# Patient Record
Sex: MALE | Race: WHITE | Employment: FULL TIME | URBAN - METROPOLITAN AREA
[De-identification: names, ages, dates, MRNs, and addresses within clinical notes are randomized per-mention and may not be internally consistent; named-entity substitution may affect disease eponyms.]

---

## 2017-06-09 ENCOUNTER — HOSPITAL ENCOUNTER (OUTPATIENT)
Dept: PHYSICAL THERAPY | Age: 62
Discharge: HOME | End: 2017-06-09
Payer: COMMERCIAL

## 2017-06-09 DIAGNOSIS — M25.512 CHRONIC LEFT SHOULDER PAIN: ICD-10-CM

## 2017-06-09 DIAGNOSIS — G89.29 CHRONIC LEFT SHOULDER PAIN: ICD-10-CM

## 2017-06-09 PROCEDURE — 97035 APP MDLTY 1+ULTRASOUND EA 15: CPT

## 2017-06-09 PROCEDURE — 97162 PT EVAL MOD COMPLEX 30 MIN: CPT

## 2017-06-09 NOTE — PROGRESS NOTES
Korin Yousif  : 1955 Therapy Center at [facility] Therapy  [address], [facility], [address]  [phone]   [phone]         OUTPATIENT PHYSICAL THERAPY:Initial Assessment 2017    ICD-10: Treatment Diagnosis: Impingement syndrome of left shoulder (M75.42)  Pain in left shoulder (M25.512)  Precautions/Allergies:   Codeine   Fall Risk Score: 7 (? 5 = High Risk)  MD Orders: Evaluate and treat,  MEDICAL/REFERRING DIAGNOSIS:  Chronic left shoulder pain [M25.512, G89.29]   DATE OF ONSET: gradual  REFERRING PHYSICIAN: Sehy Collado*  RETURN PHYSICIAN APPOINTMENT: as needed     INITIAL ASSESSMENT:  Mr. Jessy Ken presents with gradual onset of L shoulder pain over past several years with recent worsening of symptoms that impact L UE ADL's especially overhead activities and reaching behind back. Objective findings are indicative of impingement syndrome/rotator cuff dysfunction. He has mildly reduced ROM, decreased rotator cuff/scapular muscle strength and can benefit from skilled therapy to address impairments for decreased shoulder pain and improved function. PROBLEM LIST (Impacting functional limitations):  1. Decreased Strength  2. Decreased ADL/Functional Activities  3. Increased Pain  4. Decreased Activity Tolerance  5. Decreased Flexibility/Joint Mobility  6. Decreased Plato with Home Exercise Program INTERVENTIONS PLANNED  1. Home Exercise Program (HEP)  2. Manual Therapy  3. Range of Motion (ROM)  4. Therapeutic Exercise/Strengthening  5. Modalities/taping as indicated   TREATMENT PLAN:  Effective Dates: 17 TO 17. Frequency/Duration: 2 times a week for 12 weeks  GOALS: (Goals have been discussed and agreed upon with patient.)  SHORT-TERM FUNCTIONAL GOALS: Time Frame: 4 weeks  1. Decrease pain by 2/10 to allow greater ease with reaching behind back and overhead ADL's  2.  Increase strength shoulder ER and flex by 1/2 grade for greater ease with lifting and reaching ADL's  3. Greene in HEP with minimal cueing. DISCHARGE GOALS: Time Frame: 12 weeks  1. Decrease DASH 9 points for greater ease with all UE ADL's and recreational activities, including golf and total gym workout. 2. Increase strength of rotator cuff and scapula musculature to WDL for greater ease/minimal pain with overhead and reaching UE ADL's  3. Demonstrate independence in advanced home exercise program to allow DC from physical therapy. Rehabilitation Potential For Stated Goals: Good  Regarding Mario Howard's therapy, I certify that the treatment plan above will be carried out by a therapist or under their direction. Thank you for this referral,  Bertin Barahona PT     Referring Physician Signature: Angela Castellanos*              Date                    The information in this section was collected on 6/9/17  (except where otherwise noted). HISTORY:   History of Present Injury/Illness (Reason for Referral):  Patient states that he has had vague pain in both shoulders for past couple of years. Reports recent worsening of L shoulder pain to the point that he gets pretty significant sharp pain with certain movts such as reaching overhead, out to side, and particularly when reaching behind back. Pain lasts for a couple of minutes afterwards. Some aching but seems to be better since starting taking aleve. Plays golf and doesn't notice it too much with that except when reaching across body. Uses total gym at home and has had to modify some of his exercise due to increased pain - can't do fly's now for example. Hasn't been able to throw a ball for some time even with R arm. Occasional pain at night. Pain mostly in lateral shoulder, upper arm area  Past Medical History/Comorbidities:   Mr. Quintin Cleveland  has a past medical history of Agatston CAC score 100-199 (12/10/2012); Allergic rhinitis; AR (allergic rhinitis);  Chickenpox; H/O colonoscopy (1/2010 and 2/2013); Hyperlipidemia; and Peripheral neuropathy ([facility] [address] Mr. Sydney Sexton  has a past surgical history that includes orthopaedic and colonoscopy. Social History/Living Environment:     non-contributary  Prior Level of Function/Work/Activity:  Unrestricted. Works at desk most of the day  Dominant Side:         RIGHT  Current Medications:       Current Outpatient Prescriptions:     atorvastatin (LIPITOR) 20 mg tablet, Take 1 Tab by mouth daily. , Disp: 90 Tab, Rfl: 4    naproxen (NAPROSYN) 500 mg tablet, Take 1 Tab by mouth two (2) times daily (with meals). , Disp: 180 Tab, Rfl: 3    aspirin delayed-release 81 mg tablet, Take  by mouth daily. , Disp: , Rfl:     loratadine (CLARITIN) 10 mg tablet, Take 10 mg by mouth daily.   , Disp: , Rfl:    Date Last Reviewed:  6/9/2017   Number of Personal Factors/Comorbidities that affect the Plan of Care: 0: LOW COMPLEXITY   EXAMINATION:   Observation/Orthostatic Postural Assessment:          Patient with mildly protracted shoulders bilat, wide scap-spine distance  Palpation:          Mild to mod tenderness over rotator cuff insertion  ROM:       LUE Assessment  LUE Assessment (WDL): Exceptions to WDL  LUE AROM  L Shoulder Flexion: 145 (pain @ 120)  L Shoulder Extension: 45 (pain end rg)  LUE PROM  L Shoulder Internal Rotation: 45 (increased pain)  L Shoulder External Rotation: 65 (pain end rg)   IR and ER at 45 deg WDL, ltd as noted above at 90 deg abd     IR behind back to L-S area with difficulty due to pain/tightness        RUE Assessment  RUE Assessment (WDL): Within defined limits      ER at 90 deg - 105  IR at 90 deg - 55    IR behind back to upper lumbar/lower thoracic area      Strength:    LUE Strength, Tone, Sensation  LUE Strength, Tone, Sensation (WDL): Exception to WDL  LUE Strength  L Shoulder Flexion: 4  L Shoulder ABduction: 4-  L Shoulder External Rotation: 4-   Pain mainly with resisted abd     RUE Strength, Tone, Sensation  RUE Strength, Tone, Sensation (WDL): Within defined limits            Special Tests:       +ve impingement signs - Melissa Argueta. C/S WDL  Neurological Screen:       WDL  Functional Mobility:       Limited with UE ADL's due to pain  Balance:        WDL   Body Structures Involved:  1. Bones  2. Joints  3. Muscles  4. Ligaments Body Functions Affected:  1. Sensory/Pain  2. Neuromusculoskeletal  3. Movement Related Activities and Participation Affected:  1. General Tasks and Demands  2. Mobility  3. Self Care  4. Recreational acitivites   Number of elements (examined above) that affect the Plan of Care: 3: MODERATE COMPLEXITY   CLINICAL PRESENTATION:   Presentation: Evolving clinical presentation with changing clinical characteristics: MODERATE COMPLEXITY   CLINICAL DECISION MAKING:   Outcome Measure: Tool Used: Disabilities of the Arm, Shoulder and Hand (DASH) Questionnaire - Quick Version  Score:  Initial: 20/55  Most Recent: X/55 (Date: -- )   Interpretation of Score: The DASH is designed to measure the activities of daily living in person's with upper extremity dysfunction or pain. Each section is scored on a 1-5 scale, 5 representing the greatest disability. The scores of each section are added together for a total score of 55. Score 11 12-19 20-28 29-37 38-45 46-54 55   Modifier CH CI CJ CK CL CM CN     Medical Necessity:   · Patient is expected to demonstrate progress in strength, range of motion and functional technique to increase independence with all UE ADL's. · Skilled intervention continues to be required due to pain, decreased ROM & strength limiting UE ADL's. Reason for Services/Other Comments:  · Patient continues to require present interventions due to patient's inability to perform UE ADL's without pain  · Patient continues to require modification of therapeutic interventions to increase complexity of exercises.    Use of outcome tool(s) and clinical judgement create a POC that gives a: Questionable prediction of patient's progress: MODERATE COMPLEXITY            TREATMENT:   (In addition to Assessment/Re-Assessment sessions the following treatments were rendered)  Pre-treatment Symptoms/Complaints:  Pain in L shoulder/uppper arm with decreased ROM/strength  Pain: Initial:   Pain Intensity 1: 7  Post Session:  6     Therapeutic Exercise: ( ):  Exercises as outlined below to improve mobility and strength. Required moderate verbal and tactile cues to promote proper body alignment and promote proper body mechanics. Progressed resistance, range and repetitions as indicated. The patient received exercise instruction followed by patient demonstration of the exercises to include:        jm scapula retraction/depression      Side ER, 1# - 3 sets/10 - to gradually increase to 3 sets/15-20 as enzo      Jm sh ext/scap retraction, green Tband, 2-3 sets/10      Extension stretching with cane behind back - 10 reps, 4 x/day      Instructed in postural correction in sitting and standing and its contribution in facilitating normal shoulder mechanics. Manual Therapy (      ): extension stretches  Evaluation (X  ):  Therapeutic Modalities: US x 8 mins @ 1.5 w/cm L shoulder to increase circulation/promote healing. Advised to use ice at home if painful    Treatment/Session Assessment:    · Response to Treatment: :  Patient tolerated treatment today well with no complications. The patient demonstrated independence with the initial home exercises and is to perform the exercises in conjunction with continued physical therapy. Reported decreased pain at end of visit with minimal pain reaching behind back. · Compliance with Program/Exercises: Will assess as treatment progresses. · Recommendations/Intent for next treatment session: \"Next visit will focus on advancements to more challenging activities and manual therapy to increae ROM, graduated strengthening of rotator cuff/scap musc, US. \".   Total Treatment Duration:  PT Patient Time In/Time Out  Time In: 0900  Time Out: 1000  Visits: [id number], PT

## 2017-06-09 NOTE — PROGRESS NOTES
Ambulatory/Rehab Services H2 Model Falls Risk Assessment    Risk Factor Pts. ·   Confusion/Disorientation/Impulsivity  []    4 ·   Symptomatic Depression  []   2 ·   Altered Elimination  []   1 ·   Dizziness/Vertigo  []   1 ·   Gender (Male)  [x]   1 ·   Any administered antiepileptics (anticonvulsants):  []   2 ·   Any administered benzodiazepines:  []   1 ·   Visual Impairment (specify):  []   1 ·   Portable Oxygen Use  []   1 ·   Orthostatic ? BP  []   1 ·   History of Recent Falls (within 3 mos.)  []   5     Ability to Rise from Chair (choose one) Pts. ·   Ability to rise in a single movement  [x]   0 ·   Pushes up, successful in one attempt  []   1 ·   Multiple attempts, but successful  []   3 ·   Unable to rise without assistance  []   4   Total: (5 or greater = High Risk) 1     Falls Prevention Plan:   []                Physical Limitations to Exercise (specify):   []                Mobility Assistance Device (type):   []                Exercise/Equipment Adaptation (specify):    ©2010 [facility] [address] Patent #[id number].  Federal Law prohibits the replication, distribution or use without written permission from [facility] [employer]

## 2017-06-16 ENCOUNTER — HOSPITAL ENCOUNTER (OUTPATIENT)
Dept: PHYSICAL THERAPY | Age: 62
Discharge: HOME | End: 2017-06-16
Payer: COMMERCIAL

## 2017-06-16 PROCEDURE — 97035 APP MDLTY 1+ULTRASOUND EA 15: CPT

## 2017-06-16 PROCEDURE — 97110 THERAPEUTIC EXERCISES: CPT

## 2017-06-16 NOTE — PROGRESS NOTES
Korin Yousif  : 1955 Therapy Center at [facility] Therapy  [address], [address]  [phone]   [phone]         OUTPATIENT PHYSICAL THERAPY:Daily Note 2017    ICD-10: Treatment Diagnosis: Impingement syndrome of left shoulder (M75.42)  Pain in left shoulder (M25.512)  Precautions/Allergies:   Codeine   Fall Risk Score: 7 (? 5 = High Risk)  MD Orders: Evaluate and treat,  MEDICAL/REFERRING DIAGNOSIS:  Pain in left shoulder [M25.512]  Other chronic pain [G89.29]   DATE OF ONSET: gradual  REFERRING PHYSICIAN: Oswaldo Fisher. [facility].: as needed     INITIAL ASSESSMENT:  Mr. Jessy Ken presents with gradual onset of L shoulder pain over past several years with recent worsening of symptoms that impact L UE ADL's especially overhead activities and reaching behind back. Objective findings are indicative of impingement syndrome/rotator cuff dysfunction. He has mildly reduced ROM, decreased rotator cuff/scapular muscle strength and can benefit from skilled therapy to address impairments for decreased shoulder pain and improved function. PROBLEM LIST (Impacting functional limitations):  1. Decreased Strength  2. Decreased ADL/Functional Activities  3. Increased Pain  4. Decreased Activity Tolerance  5. Decreased Flexibility/Joint Mobility  6. Decreased M[place] with Home Exercise Program INTERVENTIONS PLANNED  1. Home Exercise Program (HEP)  2. Manual Therapy  3. Range of Motion (ROM)  4. Therapeutic Exercise/Strengthening  5. Modalities/taping as indicated   TREATMENT PLAN:  Effective Dates: 17 TO 17. Frequency/Duration: 2 times a week for 12 weeks  GOALS: (Goals have been discussed and agreed upon with patient.)  SHORT-TERM FUNCTIONAL GOALS: Time Frame: 4 weeks  1. Decrease pain by 2/10 to allow greater ease with reaching behind back and overhead ADL's  2.  Increase strength shoulder ER and flex by 1/2 grade for greater ease with lifting and reaching ADL's  3. Albert in HEP with minimal cueing. DISCHARGE GOALS: Time Frame: 12 weeks  1. Decrease DASH 9 points for greater ease with all UE ADL's and recreational activities, including golf and total gym workout. 2. Increase strength of rotator cuff and scapula musculature to WDL for greater ease/minimal pain with overhead and reaching UE ADL's  3. Demonstrate independence in advanced home exercise program to allow DC from physical therapy. Rehabilitation Potential For Stated Goals: Good  Regarding Pauline Howard's therapy, I certify that the treatment plan above will be carried out by a therapist or under their direction. Thank you for this referral,  Keyanna Mills, PT     Referring Physician Signature: Jaxon Silva*              Date                    The information in this section was collected on 6/9/17  (except where otherwise noted). HISTORY:   History of Present Injury/Illness (Reason for Referral):  Patient states that he has had vague pain in both shoulders for past couple of years. Reports recent worsening of L shoulder pain to the point that he gets pretty significant sharp pain with certain movts such as reaching overhead, out to side, and particularly when reaching behind back. Pain lasts for a couple of minutes afterwards. Some aching but seems to be better since starting taking aleve. Plays golf and doesn't notice it too much with that except when reaching across body. Uses total gym at home and has had to modify some of his exercise due to increased pain - can't do fly's now for example. Hasn't been able to throw a ball for some time even with R arm. Occasional pain at night. Pain mostly in lateral shoulder, upper arm area  Past Medical History/Comorbidities:   Mr. Gloria Dalal  has a past medical history of Agatston CAC score 100-199 (12/10/2012); Allergic rhinitis; AR (allergic rhinitis);  Chickenpox; H/O colonoscopy (1/2010 and 2/2013); Hyperlipidemia; and Peripheral neuropathy ([facility] [address] Mr. Yasir Kay  has a past surgical history that includes orthopaedic and colonoscopy. Social History/Living Environment:     non-contributary  Prior Level of Function/Work/Activity:  Unrestricted. Works at desk most of the day  Dominant Side:         RIGHT  Current Medications:       Current Outpatient Prescriptions:     atorvastatin (LIPITOR) 20 mg tablet, Take 1 Tab by mouth daily. , Disp: 90 Tab, Rfl: 4    naproxen (NAPROSYN) 500 mg tablet, Take 1 Tab by mouth two (2) times daily (with meals). , Disp: 180 Tab, Rfl: 3    aspirin delayed-release 81 mg tablet, Take  by mouth daily. , Disp: , Rfl:     loratadine (CLARITIN) 10 mg tablet, Take 10 mg by mouth daily. , Disp: , Rfl:    Date Last Reviewed:  6/16/2017   Number of Personal Factors/Comorbidities that affect the Plan of Care: 0: LOW COMPLEXITY   EXAMINATION:   Observation/Orthostatic Postural Assessment:          Patient with mildly protracted shoulders bilat, wide scap-spine distance  Palpation:          Mild to mod tenderness over rotator cuff insertion  ROM:           IR and ER at 45 deg WDL, ltd as noted above at 90 deg abd     IR behind back to L-S area with difficulty due to pain/tightness               ER at 90 deg - 105  IR at 90 deg - 55    IR behind back to upper lumbar/lower thoracic area      Strength:        Pain mainly with resisted abd                  Special Tests:       +ve impingement signs - Melissa Argueta. C/S WDL  Neurological Screen:       WDL  Functional Mobility:       Limited with UE ADL's due to pain  Balance:        WDL   Body Structures Involved:  1. Bones  2. Joints  3. Muscles  4. Ligaments Body Functions Affected:  1. Sensory/Pain  2. Neuromusculoskeletal  3. Movement Related Activities and Participation Affected:  1. General Tasks and Demands  2. Mobility  3. Self Care  4.  Recreational acitivites   Number of elements (examined above) that affect the Plan of Care: 3: MODERATE COMPLEXITY   CLINICAL PRESENTATION:   Presentation: Evolving clinical presentation with changing clinical characteristics: MODERATE COMPLEXITY   CLINICAL DECISION MAKING:   Outcome Measure: Tool Used: Disabilities of the Arm, Shoulder and Hand (DASH) Questionnaire - Quick Version  Score:  Initial: 20/55  Most Recent: X/55 (Date: -- )   Interpretation of Score: The DASH is designed to measure the activities of daily living in person's with upper extremity dysfunction or pain. Each section is scored on a 1-5 scale, 5 representing the greatest disability. The scores of each section are added together for a total score of 55. Score 11 12-19 20-28 29-37 38-45 46-54 55   Modifier CH CI CJ CK CL CM CN     Medical Necessity:   · Patient is expected to demonstrate progress in strength, range of motion and functional technique to increase independence with all UE ADL's. · Skilled intervention continues to be required due to pain, decreased ROM & strength limiting UE ADL's. Reason for Services/Other Comments:  · Patient continues to require present interventions due to patient's inability to perform UE ADL's without pain  · Patient continues to require modification of therapeutic interventions to increase complexity of exercises. Use of outcome tool(s) and clinical judgement create a POC that gives a: Questionable prediction of patient's progress: MODERATE COMPLEXITY            TREATMENT:   (In addition to Assessment/Re-Assessment sessions the following treatments were rendered)  Pre-treatment Symptoms/Complaints:  Patient reports he is generally sore as has been working on building deck but has not had sharp pain since initial visit.  Has been doing ex's as instructed though has only managed ext stretching ~ 2x/day  Pain in L shoulder/uppper arm with decreased ROM/strength  Pain: Initial:   Pain Intensity 1: 6  Post Session:  6     Therapeutic Exercise: (30 ):  Exercises as outlined below to improve mobility and strength. Required moderate verbal and tactile cues to promote proper body alignment and promote proper body mechanics. Progressed resistance, range and repetitions as indicated. lang scapula retraction/depression - reviewed      Side ER, 1# - 3 sets/10 - to gradually increase to 3 sets/15-20 as enzo      Lang sh ext/scap retraction, green Tband, 2 sets/10      Supine sh prot - 2 x 10, 5#      Prone rows, 5#, 2 x 10      Prone ext, 3#, 2 x 10      IR, red Tband, 2 x 10      ER, yellow Tband, 2 x 10      Extension stretching behind back - 10 reps, 3X      Postural correction in sitting and standing and its contribution in facilitating normal shoulder mechanics - reviewed  Manual Therapy (7):  jt mobs - inf glides, P-A's, long distraction  Therapeutic Modalities: US x 8 mins @ 1.5 w/cm L shoulder to increase circulation/promote healing. Advised to use ice at home if painful  HEP: As directed, to Anisha R/ER Tband, supine prot  Treatment/Session Assessment:    · Response to Treatment: :  Patient tolerated treatment today well with no complications. Tolerated ex's well - no significant pain. Good response to therapy so far. Will gradually progress ex as enzo with continued emphasis on correct scapulothoracic posn. · Compliance with Program/Exercises: Compliant with HEP. · Recommendations/Intent for next treatment session: \"Next visit will focus on advancements to more challenging activities and manual therapy to increae ROM, graduated strengthening of rotator cuff/scap musc, US. \".   Total Treatment Duration:  PT Patient Time In/Time Out  Time In: 0900  Time Out: 0945  Visits: Manuel Rodriguez PT

## 2017-06-23 ENCOUNTER — HOSPITAL ENCOUNTER (OUTPATIENT)
Dept: PHYSICAL THERAPY | Age: 62
Discharge: HOME | End: 2017-06-23
Payer: COMMERCIAL

## 2017-06-23 PROCEDURE — 97110 THERAPEUTIC EXERCISES: CPT

## 2017-06-23 PROCEDURE — 97140 MANUAL THERAPY 1/> REGIONS: CPT

## 2017-06-23 NOTE — PROGRESS NOTES
Yola Tate  : 1955 [facility] at [facility]  [address], [facility], [address]  [phone]   [phone]         OUTPATIENT PHYSICAL THERAPY:Daily Note 2017    ICD-10: Treatment Diagnosis: Impingement syndrome of left shoulder (M75.42)  Pain in left shoulder (M25.512)  Precautions/Allergies:   Codeine   Fall Risk Score: 7 (? 5 = High Risk)  MD Orders: Evaluate and treat,  MEDICAL/REFERRING DIAGNOSIS:  Pain in left shoulder [M25.512]  Other chronic pain [G89.29]   DATE OF ONSET: gradual  REFERRING PHYSICIAN: Oswaldo Fisher. [facility].: as needed     INITIAL ASSESSMENT:  Mr. ANDRE Baltazar presents with gradual onset of L shoulder pain over past several years with recent worsening of symptoms that impact L UE ADL's especially overhead activities and reaching behind back. Objective findings are indicative of impingement syndrome/rotator cuff dysfunction. He has mildly reduced ROM, decreased rotator cuff/scapular muscle strength and can benefit from skilled therapy to address impairments for decreased shoulder pain and improved function. PROBLEM LIST (Impacting functional limitations):  1. Decreased Strength  2. Decreased ADL/Functional Activities  3. Increased Pain  4. Decreased Activity Tolerance  5. Decreased Flexibility/Joint Mobility  6. Decreased [place] with Home Exercise Program INTERVENTIONS PLANNED  1. Home Exercise Program (HEP)  2. Manual Therapy  3. Range of Motion (ROM)  4. Therapeutic Exercise/Strengthening  5. Modalities/taping as indicated   TREATMENT PLAN:  Effective Dates: 17 TO 17. Frequency/Duration: 2 times a week for 12 weeks  GOALS: (Goals have been discussed and agreed upon with patient.)  SHORT-TERM FUNCTIONAL GOALS: Time Frame: 4 weeks  1. Decrease pain by 2/10 to allow greater ease with reaching behind back and overhead ADL's  2.  Increase strength shoulder ER and flex by 1/2 grade for greater ease with lifting and reaching ADL's  3. [place] in HEP with minimal cueing. DISCHARGE GOALS: Time Frame: 12 weeks  1. Decrease DASH 9 points for greater ease with all UE ADL's and recreational activities, including golf and total gym workout. 2. Increase strength of rotator cuff and scapula musculature to WDL for greater ease/minimal pain with overhead and reaching UE ADL's  3. Demonstrate independence in advanced home exercise program to allow DC from physical therapy. Rehabilitation Potential For Stated Goals: Good  Regarding Lashell Howard's therapy, I certify that the treatment plan above will be carried out by a therapist or under their direction. Thank you for this referral,  Awilda Beckman, PT     Referring Physician Signature: Jonny Stringer*              Date                    The information in this section was collected on 6/9/17  (except where otherwise noted). HISTORY:   History of Present Injury/Illness (Reason for Referral):  Patient states that he has had vague pain in both shoulders for past couple of years. Reports recent worsening of L shoulder pain to the point that he gets pretty significant sharp pain with certain movts such as reaching overhead, out to side, and particularly when reaching behind back. Pain lasts for a couple of minutes afterwards. Some aching but seems to be better since starting taking aleve. Plays golf and doesn't notice it too much with that except when reaching across body. Uses total gym at home and has had to modify some of his exercise due to increased pain - can't do fly's now for example. Hasn't been able to throw a ball for some time even with R arm. Occasional pain at night. Pain mostly in lateral shoulder, upper arm area  Past Medical History/Comorbidities:   Mr. Desirae Espinosa  has a past medical history of Agatston CAC score 100-199 (12/10/2012); Allergic rhinitis; AR (allergic rhinitis);  Chickenpox; H/O colonoscopy (1/2010 and 2/2013); Hyperlipidemia; and Peripheral neuropathy ([facility] [address] Mr. Romel Kong  has a past surgical history that includes orthopaedic and colonoscopy. Social History/Living Environment:     non-contributary  Prior Level of Function/Work/Activity:  Unrestricted. Works at desk most of the day  Dominant Side:         RIGHT  Current Medications:       Current Outpatient Prescriptions:     atorvastatin (LIPITOR) 20 mg tablet, Take 1 Tab by mouth daily. , Disp: 90 Tab, Rfl: 4    naproxen (NAPROSYN) 500 mg tablet, Take 1 Tab by mouth two (2) times daily (with meals). , Disp: 180 Tab, Rfl: 3    aspirin delayed-release 81 mg tablet, Take  by mouth daily. , Disp: , Rfl:     loratadine (CLARITIN) 10 mg tablet, Take 10 mg by mouth daily. , Disp: , Rfl:    Date Last Reviewed:  6/23/2017   Number of Personal Factors/Comorbidities that affect the Plan of Care: 0: LOW COMPLEXITY   EXAMINATION:   Observation/Orthostatic Postural Assessment:          Patient with mildly protracted shoulders bilat, wide scap-spine distance  Palpation:          Mild to mod tenderness over rotator cuff insertion  ROM:           IR and ER at 45 deg WDL, ltd as noted above at 90 deg abd     IR behind back to L-S area with difficulty due to pain/tightness               ER at 90 deg - 105  IR at 90 deg - 55    IR behind back to upper lumbar/lower thoracic area      Strength:        Pain mainly with resisted abd                  Special Tests:       +ve impingement signs - Melissa Argueta. C/S WDL  Neurological Screen:       WDL  Functional Mobility:       Limited with UE ADL's due to pain  Balance:        WDL   Body Structures Involved:  1. Bones  2. Joints  3. Muscles  4. Ligaments Body Functions Affected:  1. Sensory/Pain  2. Neuromusculoskeletal  3. Movement Related Activities and Participation Affected:  1. General Tasks and Demands  2. Mobility  3. Self Care  4.  Recreational acitivites   Number of elements (examined above) that affect the Plan of Care: 3: MODERATE COMPLEXITY   CLINICAL PRESENTATION:   Presentation: Evolving clinical presentation with changing clinical characteristics: MODERATE COMPLEXITY   CLINICAL DECISION MAKING:   Outcome Measure: Tool Used: Disabilities of the Arm, Shoulder and Hand (DASH) Questionnaire - Quick Version  Score:  Initial: 20/55  Most Recent: X/55 (Date: -- )   Interpretation of Score: The DASH is designed to measure the activities of daily living in person's with upper extremity dysfunction or pain. Each section is scored on a 1-5 scale, 5 representing the greatest disability. The scores of each section are added together for a total score of 55. Score 11 12-19 20-28 29-37 38-45 46-54 55   Modifier CH CI CJ CK CL CM CN     Medical Necessity:   · Patient is expected to demonstrate progress in strength, range of motion and functional technique to increase independence with all UE ADL's. · Skilled intervention continues to be required due to pain, decreased ROM & strength limiting UE ADL's. Reason for Services/Other Comments:  · Patient continues to require present interventions due to patient's inability to perform UE ADL's without pain  · Patient continues to require modification of therapeutic interventions to increase complexity of exercises. Use of outcome tool(s) and clinical judgement create a POC that gives a: Questionable prediction of patient's progress: MODERATE COMPLEXITY            TREATMENT:   (In addition to Assessment/Re-Assessment sessions the following treatments were rendered)  Pre-treatment Symptoms/Complaints:  Continues to report improvement with rare sharp pain in shoulder recently  Pain in L shoulder/uppper arm with decreased ROM/strength  Pain: Initial:   Pain Intensity 1: 5  Post Session:  5     Therapeutic Exercise: (30 ):  Exercises as outlined below to improve mobility and strength. Required moderate verbal and tactile cues to promote proper body alignment and promote proper body mechanics. Progressed resistance, range and repetitions as indicated. lang scapula retraction/depression - reviewed      Side ER, 1# - 3 sets/10 - to gradually increase to 3 sets/15-20 as enzo      Lang sh ext/scap retraction, green Tband, 2 sets/10      Supine sh prot - 2 x 10, 7#      Supine sh circumduction, 5#, 1 x 10 each way      Prone rows, 7#, 2 x 10      Prone ext, 3#, 2 x 10 (1 set with thumb pointed outwards)      Prone horiz abd, 1 x 10      IR, green Tband, 2 x 10      ER, red Tband, 2 x 10      Extension stretching behind back - 10 reps, 3X      Amira stretch,qaudruped posn, thumbs pointed upwards      Postural correction in sitting and standing and its contribution in facilitating normal shoulder mechanics - reviewed  Manual Therapy (10):  jt mobs - inf glides, P-A's, long distraction  Therapeutic Modalities: (held today) - US x 8 mins @ 1.5 w/cm L shoulder to increase circulation/promote healing. HEP: As directed,   Treatment/Session Assessment:    · Response to Treatment: :  Patient tolerated treatment today well with no complications. Tolerated ex's well - no significant pain. Some difficulty with horiz abd ex due to weakness, sensation of tightness but no pain. No pain with IR behind back but still mildly restricted. Good response to therapy so far. Will gradually progress ex as enzo with continued emphasis on correct scapulothoracic posn. Also work on upper thoracic mobility particularly in view of playing golf. · Compliance with Program/Exercises: Compliant with HEP. · Recommendations/Intent for next treatment session: \"Next visit will focus on advancements to more challenging activities and manual therapy to increae ROM, graduated strengthening of rotator cuff/scap musc, US. \".   Total Treatment Duration:  PT Patient Time In/Time Out  Time In: 1030  Time Out: 1120  Visits: JERE Edmondson 54, PT

## 2017-07-07 ENCOUNTER — HOSPITAL ENCOUNTER (OUTPATIENT)
Dept: PHYSICAL THERAPY | Age: 62
Discharge: HOME | End: 2017-07-07
Payer: COMMERCIAL

## 2017-07-07 PROCEDURE — 97035 APP MDLTY 1+ULTRASOUND EA 15: CPT

## 2017-07-07 PROCEDURE — 97110 THERAPEUTIC EXERCISES: CPT

## 2017-07-07 PROCEDURE — 97140 MANUAL THERAPY 1/> REGIONS: CPT

## 2017-07-07 NOTE — PROGRESS NOTES
Indiana Luis  : 1955 [facility] at [facility]  [address], [facility], [address]  [phone]   [phone]         OUTPATIENT PHYSICAL THERAPY:Daily Note 2017    ICD-10: Treatment Diagnosis: Impingement syndrome of left shoulder (M75.42)  Pain in left shoulder (M25.512)  Precautions/Allergies:   Codeine   Fall Risk Score: 7 (? 5 = High Risk)  MD Orders: Evaluate and treat,  MEDICAL/REFERRING DIAGNOSIS:  Pain in left shoulder [M25.512]  Other chronic pain [G89.29]   DATE OF ONSET: gradual  REFERRING PHYSICIAN: Oswaldo Fisher. [facility].: as needed     INITIAL ASSESSMENT:  Mr. Karel Najjar presents with gradual onset of L shoulder pain over past several years with recent worsening of symptoms that impact L UE ADL's especially overhead activities and reaching behind back. Objective findings are indicative of impingement syndrome/rotator cuff dysfunction. He has mildly reduced ROM, decreased rotator cuff/scapular muscle strength and can benefit from skilled therapy to address impairments for decreased shoulder pain and improved function. PROBLEM LIST (Impacting functional limitations):  1. Decreased Strength  2. Decreased ADL/Functional Activities  3. Increased Pain  4. Decreased Activity Tolerance  5. Decreased Flexibility/Joint Mobility  6. Decreased [place] with Home Exercise Program INTERVENTIONS PLANNED  1. Home Exercise Program (HEP)  2. Manual Therapy  3. Range of Motion (ROM)  4. Therapeutic Exercise/Strengthening  5. Modalities/taping as indicated   TREATMENT PLAN:  Effective Dates: 17 TO 17. Frequency/Duration: 2 times a week for 12 weeks  GOALS: (Goals have been discussed and agreed upon with patient.)  SHORT-TERM FUNCTIONAL GOALS: Time Frame: 4 weeks  1. Decrease pain by 2/10 to allow greater ease with reaching behind back and overhead ADL's  2.  Increase strength shoulder ER and flex by 1/2 grade for greater ease with lifting and reaching ADL's  3. [place] in HEP with minimal cueing. DISCHARGE GOALS: Time Frame: 12 weeks  1. Decrease DASH 9 points for greater ease with all UE ADL's and recreational activities, including golf and total gym workout. 2. Increase strength of rotator cuff and scapula musculature to WDL for greater ease/minimal pain with overhead and reaching UE ADL's  3. Demonstrate independence in advanced home exercise program to allow DC from physical therapy. Rehabilitation Potential For Stated Goals: Good  Regarding Jerzy Howard's therapy, I certify that the treatment plan above will be carried out by a therapist or under their direction. Thank you for this referral,  Panfilo Castillo PT     Referring Physician Signature: Dayami Danielson*              Date                    The information in this section was collected on 6/9/17  (except where otherwise noted). HISTORY:   History of Present Injury/Illness (Reason for Referral):  Patient states that he has had vague pain in both shoulders for past couple of years. Reports recent worsening of L shoulder pain to the point that he gets pretty significant sharp pain with certain movts such as reaching overhead, out to side, and particularly when reaching behind back. Pain lasts for a couple of minutes afterwards. Some aching but seems to be better since starting taking aleve. Plays golf and doesn't notice it too much with that except when reaching across body. Uses total gym at home and has had to modify some of his exercise due to increased pain - can't do fly's now for example. Hasn't been able to throw a ball for some time even with R arm. Occasional pain at night. Pain mostly in lateral shoulder, upper arm area  Past Medical History/Comorbidities:   Mr. Shruthi Montaño  has a past medical history of Agatston CAC score 100-199 (12/10/2012); Allergic rhinitis; AR (allergic rhinitis);  Chickenpox; H/O colonoscopy (1/2010 and 2/2013); Hyperlipidemia; and Peripheral neuropathy ([facility] [address] Mr. [facility]  has a past surgical history that includes orthopaedic and colonoscopy. Social History/Living Environment:     non-contributary  Prior Level of Function/Work/Activity:  Unrestricted. Works at desk most of the day  Dominant Side:         RIGHT  Current Medications:       Current Outpatient Prescriptions:     atorvastatin (LIPITOR) 20 mg tablet, Take 1 Tab by mouth daily. , Disp: 90 Tab, Rfl: 4    naproxen (NAPROSYN) 500 mg tablet, Take 1 Tab by mouth two (2) times daily (with meals). , Disp: 180 Tab, Rfl: 3    aspirin delayed-release 81 mg tablet, Take  by mouth daily. , Disp: , Rfl:     loratadine (CLARITIN) 10 mg tablet, Take 10 mg by mouth daily. , Disp: , Rfl:    Date Last Reviewed:  7/7/2017   Number of Personal Factors/Comorbidities that affect the Plan of Care: 0: LOW COMPLEXITY   EXAMINATION:   Observation/Orthostatic Postural Assessment:          Patient with mildly protracted shoulders bilat, wide scap-spine distance  Palpation:          Mild to mod tenderness over rotator cuff insertion  ROM:           IR and ER at 45 deg WDL, ltd as noted above at 90 deg abd     IR behind back to L-S area with difficulty due to pain/tightness               ER at 90 deg - 105  IR at 90 deg - 55    IR behind back to upper lumbar/lower thoracic area      Strength:        Pain mainly with resisted abd                  Special Tests:       +ve impingement signs - Melissa Argueta. C/S WDL  Neurological Screen:       WDL  Functional Mobility:       Limited with UE ADL's due to pain  Balance:        WDL   Body Structures Involved:  1. Bones  2. Joints  3. Muscles  4. Ligaments Body Functions Affected:  1. Sensory/Pain  2. Neuromusculoskeletal  3. Movement Related Activities and Participation Affected:  1. General Tasks and Demands  2. Mobility  3. Self Care  4.  Recreational acitivites   Number of elements (examined above) that affect the Plan of Care: 3: MODERATE COMPLEXITY   CLINICAL PRESENTATION:   Presentation: Evolving clinical presentation with changing clinical characteristics: MODERATE COMPLEXITY   CLINICAL DECISION MAKING:   Outcome Measure: Tool Used: Disabilities of the Arm, Shoulder and Hand (DASH) Questionnaire - Quick Version  Score:  Initial: 20/55  Most Recent: X/55 (Date: -- )   Interpretation of Score: The DASH is designed to measure the activities of daily living in person's with upper extremity dysfunction or pain. Each section is scored on a 1-5 scale, 5 representing the greatest disability. The scores of each section are added together for a total score of 55. Score 11 12-19 20-28 29-37 38-45 46-54 55   Modifier CH CI CJ CK CL CM CN     Medical Necessity:   · Patient is expected to demonstrate progress in strength, range of motion and functional technique to increase independence with all UE ADL's. · Skilled intervention continues to be required due to pain, decreased ROM & strength limiting UE ADL's. Reason for Services/Other Comments:  · Patient continues to require present interventions due to patient's inability to perform UE ADL's without pain  · Patient continues to require modification of therapeutic interventions to increase complexity of exercises. Use of outcome tool(s) and clinical judgement create a POC that gives a: Questionable prediction of patient's progress: MODERATE COMPLEXITY            TREATMENT:   (In addition to Assessment/Re-Assessment sessions the following treatments were rendered)  Pre-treatment Symptoms/Complaints:  Shoulder pain definitely improved - still feels it when reaches behind back but much less severe and doesn't stop him from doing it. Pain in L shoulder/uppper arm with decreased ROM/strength  Pain: Initial:   Pain Intensity 1: 4  Post Session:  5     Therapeutic Exercise: (40 ):  Exercises as outlined below to improve mobility and strength.   Required moderate verbal and tactile cues to promote proper body alignment and promote proper body mechanics. Progressed resistance, range and repetitions as indicated. jm scapula retraction/depression - reviewed      Side ER, 2# - 2 sets/10       Jm sh ext/scap retraction, green Tband, 2 sets/10      Supine sh flex, green Tband, 2 x 15      Supine sh prot - 2 x 10, 8#      Supine sh circumduction, 5#, 1 x 10 each way      Prone rows, 8#, 2 x 12      Prone ext, 4#, 2 x 10 (1 set with thumb pointed outwards)      Prone horiz abd, 2 x 10 (cueing to get to full rg)      IR, 35#, 2 x 10      ER, 15#, 2 x 10      Standing rows with scap ret, 40#, 2 x 10      Fwd press with prot end rg, 303, 2 x 10      Boing for stab - 2-3 sets 45 sec      [place] red Tband, 2 x 12 (followed by 1 set fast reps mid rg)      Prayer stretch,qaudruped posn, thumbs pointed upwards      Postural correction in sitting and standing and its contribution in facilitating normal shoulder mechanics - reviewed  Manual Therapy (12 min):  jt mobs - inf glides, P-A's, long distraction, extension stretches, hold relax technques to increase IR mob  Therapeutic Modalities:- US x 8 mins @ 1.5 w/cm L shoulder to increase circulation/promote healing. HEP: As directed,   Treatment/Session Assessment:    · Response to Treatment: :  Patient tolerated treatment today well with no complications. Tolerated ex's well - no significant pain. Some difficulty with horiz abd ex due to weakness, sensation of tightness but no pain. No pain with IR behind back and mob improved. Continued good response to therapy so far. Will gradually progress ex as enzo with continued emphasis on correct scapulothoracic posn. Also work on upper thoracic mobility particularly in view of playing golf. Work towards discharge on HEP in 2-3 weeks if progress continues  · Compliance with Program/Exercises: Compliant with HEP. · Recommendations/Intent for next treatment session:  \"Next visit will focus on advancements to more challenging activities and manual therapy to increae ROM, graduated strengthening of rotator cuff/scap musc, US. \".   Total Treatment Duration:  PT Patient Time In/Time Out  Time In: 0900  Time Out: 1000  Visits: [address] [place] [address] Street, PT

## 2017-08-25 NOTE — PROGRESS NOTES
Maritza Gil  : 1955 Therapy Center at [facility]  [address], [facility], [address]  [phone]   [phone]         OUTPATIENT PHYSICAL THERAPY:Discontinuation Summary 2017    ICD-10: Treatment Diagnosis: Impingement syndrome of left shoulder (M75.42)  Pain in left shoulder (M25.512)  Precautions/Allergies:   Codeine   Fall Risk Score: 7 (? 5 = High Risk)  MD Orders: Evaluate and treat,  MEDICAL/REFERRING DIAGNOSIS:  Pain in left shoulder [M25.512]  Other chronic pain [G89.29]   DATE OF ONSET: gradual  REFERRING PHYSICIAN: Oswaldo Fisher. [facility].: as needed     ASSESSMENT:  Mr. Clare Estrella presented with gradual onset of L shoulder pain over the past several years with recent worsening of symptoms that impacted L UE ADL's especially overhead activities and reaching behind back. Objective findings were indicative of impingement syndrome/rotator cuff dysfunction. He had mildly reduced ROM, and decreased rotator cuff/scapular muscle strength. He was seen for 4 visits for manual therapy, ROM and strengthening exercises for shoulder/scapula, along with posture education and thoracic mobility exercises. He was making progress with patient reporting that shoulder pain had definitely improved - still felt it with IR behind back but much less severe and was able to do it. He was very compliant with HEP. He was to schedule a couple more visits before discharge on independent program but, as we have not heard from him, he will be discontinued from therapy at this time. Goals not able to be assessed. PROBLEM LIST (Impacting functional limitations):  1. Decreased Strength  2. Decreased ADL/Functional Activities  3. Increased Pain  4. Decreased Activity Tolerance  5. Decreased Flexibility/Joint Mobility  6. Decreased [place] with Home Exercise Program INTERVENTIONS PLANNED  1. Home Exercise Program (HEP)  2.  Manual Therapy  3. Range of Motion (ROM)  4. Therapeutic Exercise/Strengthening  5. Modalities/taping as indicated     GOALS: (Goals have been discussed and agreed upon with patient.)  SHORT-TERM FUNCTIONAL GOALS: Time Frame: 4 weeks  1. Decrease pain by 2/10 to allow greater ease with reaching behind back and overhead ADL's - met  2. Increase strength shoulder ER and flex by 1/2 grade for greater ease with lifting and reaching ADL's  3. [place] in HEP with minimal cueing. - met  DISCHARGE GOALS: Time Frame: 12 weeks  1. Decrease DASH 9 points for greater ease with all UE ADL's and recreational activities, including golf and total gym workout. 2. Increase strength of rotator cuff and scapula musculature to WDL for greater ease/minimal pain with overhead and reaching UE ADL's  3. Demonstrate independence in advanced home exercise program to allow DC from physical therapy. Goal status unknown.     Thank you for this referral,    Liane Lira, PT

## 2018-08-14 PROBLEM — I25.10 CORONARY ARTERY CALCIFICATION: Status: ACTIVE | Noted: 2018-08-14

## 2018-08-14 PROBLEM — I25.84 CORONARY ARTERY CALCIFICATION: Status: ACTIVE | Noted: 2018-08-14

## 2019-05-08 ENCOUNTER — HOSPITAL ENCOUNTER (OUTPATIENT)
Dept: GENERAL RADIOLOGY | Age: 64
Discharge: HOME | End: 2019-05-08
Payer: COMMERCIAL

## 2019-05-08 DIAGNOSIS — J20.9 ACUTE BRONCHITIS, UNSPECIFIED ORGANISM: ICD-10-CM

## 2019-05-08 PROCEDURE — 71046 X-RAY EXAM CHEST 2 VIEWS: CPT

## 2022-03-19 PROBLEM — I25.84 CORONARY ARTERY CALCIFICATION: Status: ACTIVE | Noted: 2018-08-14

## 2022-03-19 PROBLEM — I25.10 CORONARY ARTERY CALCIFICATION: Status: ACTIVE | Noted: 2018-08-14

## 2022-07-27 DIAGNOSIS — I25.84 CORONARY ARTERY CALCIFICATION: Primary | ICD-10-CM

## 2022-07-27 DIAGNOSIS — I25.10 CORONARY ARTERY CALCIFICATION: Primary | ICD-10-CM

## 2022-07-27 RX ORDER — ATORVASTATIN CALCIUM 80 MG/1
TABLET, FILM COATED ORAL
Qty: 90 TABLET | Refills: 2 | Status: SHIPPED | OUTPATIENT
Start: 2022-07-27

## 2022-07-27 NOTE — TELEPHONE ENCOUNTER
Requested Prescriptions     Pending Prescriptions Disp Refills    atorvastatin (LIPITOR) 80 MG tablet [Pharmacy Med Name: ATORVASTATIN 80MG TABLETS] 90 tablet      Sig: TAKE 1 TABLET BY MOUTH EVERY DAY

## 2023-05-17 DIAGNOSIS — I25.84 CORONARY ARTERY CALCIFICATION: Primary | ICD-10-CM

## 2023-05-17 DIAGNOSIS — I25.10 CORONARY ARTERY CALCIFICATION: Primary | ICD-10-CM

## 2023-05-17 RX ORDER — ATORVASTATIN CALCIUM 80 MG/1
80 TABLET, FILM COATED ORAL DAILY
Qty: 90 TABLET | Refills: 3 | Status: SHIPPED | OUTPATIENT
Start: 2023-05-17

## 2023-05-17 NOTE — TELEPHONE ENCOUNTER
MEDICATION REFILL REQUEST      Name of Medication:  Atorvastatin   Dose:  80 mg  Frequency:  daily   Quantity:    Days' supply:  90      Pharmacy Name/Location:  did not leave

## 2023-05-19 DIAGNOSIS — I25.84 CORONARY ARTERY CALCIFICATION: ICD-10-CM

## 2023-05-19 DIAGNOSIS — I25.10 CORONARY ARTERY CALCIFICATION: ICD-10-CM

## 2023-05-19 LAB
CHOLEST SERPL-MCNC: 131 MG/DL
HDLC SERPL-MCNC: 53 MG/DL (ref 40–60)
HDLC SERPL: 2.5
LDLC SERPL CALC-MCNC: 61 MG/DL
TRIGL SERPL-MCNC: 85 MG/DL (ref 35–150)
VLDLC SERPL CALC-MCNC: 17 MG/DL (ref 6–23)

## 2024-03-08 DIAGNOSIS — I25.84 CORONARY ARTERY CALCIFICATION: ICD-10-CM

## 2024-03-08 DIAGNOSIS — I25.10 CORONARY ARTERY CALCIFICATION: ICD-10-CM

## 2024-03-11 RX ORDER — ATORVASTATIN CALCIUM 80 MG/1
80 TABLET, FILM COATED ORAL DAILY
Qty: 90 TABLET | Refills: 3 | OUTPATIENT
Start: 2024-03-11

## 2024-11-01 ENCOUNTER — APPOINTMENT (RX ONLY)
Dept: URBAN - METROPOLITAN AREA CLINIC 329 | Facility: CLINIC | Age: 69
Setting detail: DERMATOLOGY
End: 2024-11-01

## 2024-11-01 DIAGNOSIS — D22 MELANOCYTIC NEVI: ICD-10-CM

## 2024-11-01 DIAGNOSIS — L82.1 OTHER SEBORRHEIC KERATOSIS: ICD-10-CM

## 2024-11-01 DIAGNOSIS — L57.0 ACTINIC KERATOSIS: ICD-10-CM

## 2024-11-01 DIAGNOSIS — L81.4 OTHER MELANIN HYPERPIGMENTATION: ICD-10-CM

## 2024-11-01 DIAGNOSIS — D18.0 HEMANGIOMA: ICD-10-CM

## 2024-11-01 DIAGNOSIS — Z71.89 OTHER SPECIFIED COUNSELING: ICD-10-CM

## 2024-11-01 PROBLEM — D18.01 HEMANGIOMA OF SKIN AND SUBCUTANEOUS TISSUE: Status: ACTIVE | Noted: 2024-11-01

## 2024-11-01 PROBLEM — D22.62 MELANOCYTIC NEVI OF LEFT UPPER LIMB, INCLUDING SHOULDER: Status: ACTIVE | Noted: 2024-11-01

## 2024-11-01 PROBLEM — D22.5 MELANOCYTIC NEVI OF TRUNK: Status: ACTIVE | Noted: 2024-11-01

## 2024-11-01 PROCEDURE — ? COUNSELING

## 2024-11-01 PROCEDURE — ? PRESCRIPTION

## 2024-11-01 PROCEDURE — ? PRESCRIPTION MEDICATION MANAGEMENT

## 2024-11-01 PROCEDURE — ? ADDITIONAL NOTES

## 2024-11-01 PROCEDURE — 99203 OFFICE O/P NEW LOW 30 MIN: CPT

## 2024-11-01 RX ORDER — FLUOROURACIL 2 G/40G
CREAM TOPICAL BID
Qty: 40 | Refills: 0 | Status: ERX | COMMUNITY
Start: 2024-11-01

## 2024-11-01 RX ADMIN — FLUOROURACIL: 2 CREAM TOPICAL at 00:00

## 2024-11-01 ASSESSMENT — LOCATION DETAILED DESCRIPTION DERM
LOCATION DETAILED: LEFT SUPERIOR OCCIPITAL SCALP
LOCATION DETAILED: LEFT ANTERIOR SHOULDER
LOCATION DETAILED: RIGHT SUPERIOR MEDIAL UPPER BACK
LOCATION DETAILED: RIGHT SUPERIOR UPPER BACK
LOCATION DETAILED: RIGHT SUPERIOR OCCIPITAL SCALP
LOCATION DETAILED: LEFT LATERAL ABDOMEN
LOCATION DETAILED: INFERIOR THORACIC SPINE
LOCATION DETAILED: LEFT MEDIAL INFERIOR CHEST
LOCATION DETAILED: LEFT ANTERIOR PROXIMAL UPPER ARM
LOCATION DETAILED: LEFT INFERIOR MEDIAL UPPER BACK
LOCATION DETAILED: RIGHT INFERIOR MEDIAL FOREHEAD

## 2024-11-01 ASSESSMENT — LOCATION SIMPLE DESCRIPTION DERM
LOCATION SIMPLE: UPPER BACK
LOCATION SIMPLE: RIGHT FOREHEAD
LOCATION SIMPLE: LEFT UPPER BACK
LOCATION SIMPLE: POSTERIOR SCALP
LOCATION SIMPLE: RIGHT UPPER BACK
LOCATION SIMPLE: CHEST
LOCATION SIMPLE: LEFT SHOULDER
LOCATION SIMPLE: ABDOMEN
LOCATION SIMPLE: LEFT UPPER ARM

## 2024-11-01 ASSESSMENT — LOCATION ZONE DERM
LOCATION ZONE: ARM
LOCATION ZONE: FACE
LOCATION ZONE: TRUNK
LOCATION ZONE: SCALP

## 2024-11-01 NOTE — PROCEDURE: PRESCRIPTION MEDICATION MANAGEMENT
Render In Strict Bullet Format?: No
Detail Level: Zone
Initiate Treatment: Efudex 5 % topical cream - Apply pea sized amount two times a day to affected areas for 2-6 weeks (or until red/irritated).
Plan: Follow up in 3 months to evaluate response to Efudex

## 2024-11-01 NOTE — PROCEDURE: ADDITIONAL NOTES
Detail Level: Simple
Additional Notes: Discussed different treatment options including LN2, Efudex/Imiquimod, and PDT Blue light. Pt states he is going on vacation and does not want to treat lesions with LN2 today due to the possibility of blistering. Offered to reschedule for follow up LN2 if he prefers but pt states he would rather try the Efudex cream.
Render Risk Assessment In Note?: no

## 2024-11-01 NOTE — HPI: EVALUATION OF SKIN LESION(S)
What Type Of Note Output Would You Prefer (Optional)?: Bullet Format
Hpi Title: Evaluation of Skin Lesions
Year Removed: 1900
Additional History: Reports the spots on his scalp are scaly and irritated. They have not bled.